# Patient Record
Sex: MALE | Race: WHITE
[De-identification: names, ages, dates, MRNs, and addresses within clinical notes are randomized per-mention and may not be internally consistent; named-entity substitution may affect disease eponyms.]

---

## 2019-07-22 ENCOUNTER — HOSPITAL ENCOUNTER (EMERGENCY)
Dept: HOSPITAL 26 - MED | Age: 64
Discharge: HOME | End: 2019-07-22
Payer: COMMERCIAL

## 2019-07-22 VITALS — DIASTOLIC BLOOD PRESSURE: 90 MMHG | SYSTOLIC BLOOD PRESSURE: 140 MMHG

## 2019-07-22 VITALS — BODY MASS INDEX: 34.82 KG/M2 | WEIGHT: 280 LBS | HEIGHT: 75 IN

## 2019-07-22 DIAGNOSIS — I50.9: ICD-10-CM

## 2019-07-22 DIAGNOSIS — Z76.0: ICD-10-CM

## 2019-07-22 DIAGNOSIS — E11.9: Primary | ICD-10-CM

## 2019-07-22 DIAGNOSIS — Z86.73: ICD-10-CM

## 2019-07-22 NOTE — NUR
Patient discharged with v/s stable. Written and verbal after care instructions 
given and explained. 

Patient alert, oriented and verbalized understanding of instructions. Left 
using wheelchair device. All questions addressed prior to discharge. ID band 
removed. Patient advised to follow up with PMD. Rx of Metformin given. Patient 
educated on indication of medication including possible reaction and side 
effects. Opportunity to ask questions provided and answered.

## 2019-07-22 NOTE — NUR
64/M PRESENTS TO ED FROM [facility] C/O MEDICAL EXAM. PT STATES THAT HIS 
HEALTH IS NOT MONITORED AS IT SHOULD BE AT THE [facility], HE WOULD LIKE TO 
BE SEEN TO MAKE SURE HIS HEALTH IS OKAY. DUE TO A SEIZURE A MONTH AGO AND DUE 
TO MEDICAL HX OF HTN, CHF, DM AND SEIZURES.

## 2019-09-04 ENCOUNTER — HOSPITAL ENCOUNTER (EMERGENCY)
Dept: HOSPITAL 26 - MED | Age: 64
Discharge: HOME | End: 2019-09-04
Payer: COMMERCIAL

## 2019-09-04 VITALS — HEIGHT: 75 IN | WEIGHT: 200 LBS | BODY MASS INDEX: 24.87 KG/M2

## 2019-09-04 VITALS — SYSTOLIC BLOOD PRESSURE: 122 MMHG | DIASTOLIC BLOOD PRESSURE: 95 MMHG

## 2019-09-04 VITALS — DIASTOLIC BLOOD PRESSURE: 81 MMHG | SYSTOLIC BLOOD PRESSURE: 156 MMHG

## 2019-09-04 DIAGNOSIS — I50.9: ICD-10-CM

## 2019-09-04 DIAGNOSIS — R53.1: Primary | ICD-10-CM

## 2019-09-04 DIAGNOSIS — E11.9: ICD-10-CM

## 2019-09-04 DIAGNOSIS — Z98.890: ICD-10-CM

## 2019-09-04 DIAGNOSIS — Z86.73: ICD-10-CM

## 2019-09-04 DIAGNOSIS — N39.0: ICD-10-CM

## 2019-09-04 DIAGNOSIS — I11.0: ICD-10-CM

## 2019-09-04 LAB
ALBUMIN FLD-MCNC: 3.1 G/DL (ref 3.4–5)
ANION GAP SERPL CALCULATED.3IONS-SCNC: 11.4 MMOL/L (ref 8–16)
APPEARANCE UR: (no result)
AST SERPL-CCNC: 14 U/L (ref 15–37)
BASOPHILS # BLD AUTO: 0 K/UL (ref 0–0.22)
BASOPHILS NFR BLD AUTO: 0.3 % (ref 0–2)
BILIRUB SERPL-MCNC: 0.3 MG/DL (ref 0–1)
BILIRUB UR QL STRIP: NEGATIVE
BUN SERPL-MCNC: 16 MG/DL (ref 7–18)
CHLORIDE SERPL-SCNC: 103 MMOL/L (ref 98–107)
CO2 SERPL-SCNC: 28.9 MMOL/L (ref 21–32)
COLOR UR: YELLOW
CREAT SERPL-MCNC: 1.8 MG/DL (ref 0.7–1.3)
EOSINOPHIL # BLD AUTO: 0 K/UL (ref 0–0.4)
EOSINOPHIL NFR BLD AUTO: 0.1 % (ref 0–4)
ERYTHROCYTE [DISTWIDTH] IN BLOOD BY AUTOMATED COUNT: 14.9 % (ref 11.6–13.7)
GFR SERPL CREATININE-BSD FRML MDRD: 49 ML/MIN (ref 90–?)
GLUCOSE SERPL-MCNC: 189 MG/DL (ref 74–106)
GLUCOSE UR STRIP-MCNC: NEGATIVE MG/DL
HCT VFR BLD AUTO: 32.9 % (ref 36–52)
HGB BLD-MCNC: 10.9 G/DL (ref 12–18)
HGB UR QL STRIP: (no result)
LEUKOCYTE ESTERASE UR QL STRIP: (no result)
LYMPHOCYTES # BLD AUTO: 1.1 K/UL (ref 2–11.5)
LYMPHOCYTES NFR BLD AUTO: 11.9 % (ref 20.5–51.1)
MCH RBC QN AUTO: 27 PG (ref 27–31)
MCHC RBC AUTO-ENTMCNC: 33 G/DL (ref 33–37)
MCV RBC AUTO: 81.8 FL (ref 80–94)
MONOCYTES # BLD AUTO: 0.7 K/UL (ref 0.8–1)
MONOCYTES NFR BLD AUTO: 7.6 % (ref 1.7–9.3)
NEUTROPHILS # BLD AUTO: 7.2 K/UL (ref 1.8–7.7)
NEUTROPHILS NFR BLD AUTO: 80.1 % (ref 42.2–75.2)
NITRITE UR QL STRIP: POSITIVE
PH UR STRIP: 6.5 [PH] (ref 5–9)
PLATELET # BLD AUTO: 249 K/UL (ref 140–450)
POTASSIUM SERPL-SCNC: 4.3 MMOL/L (ref 3.5–5.1)
PROTHROMBIN TIME: 9.5 SECS (ref 10.8–13.4)
RBC # BLD AUTO: 4.03 MIL/UL (ref 4.2–6.1)
RBC #/AREA URNS HPF: (no result) /HPF (ref 0–5)
SODIUM SERPL-SCNC: 139 MMOL/L (ref 136–145)
WBC # BLD AUTO: 8.9 K/UL (ref 4.8–10.8)
WBC,URINE: (no result) /HPF (ref 0–5)

## 2019-09-04 PROCEDURE — 96365 THER/PROPH/DIAG IV INF INIT: CPT

## 2019-09-04 PROCEDURE — 83880 ASSAY OF NATRIURETIC PEPTIDE: CPT

## 2019-09-04 PROCEDURE — 99284 EMERGENCY DEPT VISIT MOD MDM: CPT

## 2019-09-04 PROCEDURE — 84484 ASSAY OF TROPONIN QUANT: CPT

## 2019-09-04 PROCEDURE — 81001 URINALYSIS AUTO W/SCOPE: CPT

## 2019-09-04 PROCEDURE — 85025 COMPLETE CBC W/AUTO DIFF WBC: CPT

## 2019-09-04 PROCEDURE — 82948 REAGENT STRIP/BLOOD GLUCOSE: CPT

## 2019-09-04 PROCEDURE — 36415 COLL VENOUS BLD VENIPUNCTURE: CPT

## 2019-09-04 PROCEDURE — 93005 ELECTROCARDIOGRAM TRACING: CPT

## 2019-09-04 PROCEDURE — 85730 THROMBOPLASTIN TIME PARTIAL: CPT

## 2019-09-04 PROCEDURE — 87040 BLOOD CULTURE FOR BACTERIA: CPT

## 2019-09-04 PROCEDURE — 80053 COMPREHEN METABOLIC PANEL: CPT

## 2019-09-04 PROCEDURE — 83605 ASSAY OF LACTIC ACID: CPT

## 2019-09-04 PROCEDURE — 85610 PROTHROMBIN TIME: CPT

## 2019-09-04 PROCEDURE — 70450 CT HEAD/BRAIN W/O DYE: CPT

## 2019-09-04 PROCEDURE — 87086 URINE CULTURE/COLONY COUNT: CPT

## 2019-09-04 PROCEDURE — 71045 X-RAY EXAM CHEST 1 VIEW: CPT

## 2019-09-04 NOTE — NUR
PT ASLEEP. EASILY AROUSABLE BY VOICE. FULL CLEAR SPEECH. EVEN AND UNLABORED 
BREATHING. NO SIGNS AND SYMPTOMS OF DISTRESS NOTED. PT ON FULL CARDIAC MONITOR. 
WILL CONTINUE TO MONITOR.

## 2019-09-04 NOTE — NUR
-------------------------------------------------------------------------------

            *** Note undone in EDM - 09/04/19 at 0554 by MEDNORA ***             

-------------------------------------------------------------------------------

65 Y/O MALE BIBA C/O SORENESS/WEAKNESS/ STIFFNESS S/P SLIP AND FALL IN THE 
RESTROOM 5 AM YESTERDAY. PAIN IS A 6/10,  GENERALIZED. PATIENT IS PARALYZED ON 
THE RIGHT SIDE. A/O X4 TO PERSON PLACE, TIME, DATE. HE IS CONTRACTED TO THE 
RIGHT SIDE. 



PMH: HTN, CHF, DM, SEIZURES

RX:SEE MED REC. 

ALLERGIES: NONE Please see first message regarding anemia. Please asked patient if she be interested in getting IV iron to expedite her anemia improving. If so please right referral to hematology: Diagnoses iron deficiency anemia and comments section add menorrhagia. Thanks.

## 2019-09-04 NOTE — NUR
63 Y/O MALE BIBA C/O SORENESS/WEAKNESS/ STIFFNESS S/P SLIP AND FALL IN THE 
RESTROOM 5 AM YESTERDAY. PAIN IS A 6/10,  GENERALIZED. PATIENT IS PARALYZED ON 
THE RIGHT SIDE. A/O X4 TO PERSON PLACE, TIME, DATE. HE IS CONTRACTED TO THE 
RIGHT SIDE. ERMD AWARE OF PATIENT. SIDE RAILSX2.



PMH: HTN, CHF, DM, SEIZURES

RX:SEE MED REC. 

ALLERGIES: NONE

## 2019-09-04 NOTE — NUR
Patient discharged with v/s stable. Written and verbal after care instructions 
given and explained. Patient alert, oriented and verbalized understanding of 
instructions. Wheel Chair Assisted with by tommie Corbett. All questions 
addressed prior to discharge. ID band removed. Patient advised to follow up 
with PMD. Rx of Bactrim given. Patient educated on indication of medication 
including possible reaction and side effects. Opportunity to ask questions 
provided and answered. Pt transported back to [facility] by Tommie Corbett.

## 2020-01-09 ENCOUNTER — HOSPITAL ENCOUNTER (EMERGENCY)
Dept: HOSPITAL 26 - MED | Age: 65
Discharge: NURSING, REHAB OR LONG-TERM CARE | End: 2020-01-09
Payer: COMMERCIAL

## 2020-01-09 VITALS — WEIGHT: 280 LBS | BODY MASS INDEX: 34.82 KG/M2 | HEIGHT: 75 IN

## 2020-01-09 VITALS — DIASTOLIC BLOOD PRESSURE: 91 MMHG | SYSTOLIC BLOOD PRESSURE: 176 MMHG

## 2020-01-09 VITALS — SYSTOLIC BLOOD PRESSURE: 143 MMHG | DIASTOLIC BLOOD PRESSURE: 89 MMHG

## 2020-01-09 DIAGNOSIS — I11.0: ICD-10-CM

## 2020-01-09 DIAGNOSIS — I50.9: ICD-10-CM
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
## 2020-01-09 DIAGNOSIS — Z86.69: ICD-10-CM

## 2020-01-09 DIAGNOSIS — Z86.73: ICD-10-CM

## 2020-01-09 DIAGNOSIS — S93.401A: Primary | ICD-10-CM
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
## 2020-01-09 DIAGNOSIS — E11.9: ICD-10-CM

## 2020-01-09 PROCEDURE — 96372 THER/PROPH/DIAG INJ SC/IM: CPT

## 2020-01-09 PROCEDURE — 99283 EMERGENCY DEPT VISIT LOW MDM: CPT

## 2020-01-09 PROCEDURE — 73610 X-RAY EXAM OF ANKLE: CPT

## 2020-01-09 RX ADMIN — KETOROLAC TROMETHAMINE ONE MG: 60 INJECTION, SOLUTION INTRAMUSCULAR at 17:25

## 2020-01-09 NOTE — NUR
RIGHT ANKLE WRAPPED WITH AN ACE WRAP #3, PULSES, MOTOR, AND CIRCULATION INTACT 
BEFORE AND AFTER APPLICATION.

## 2020-01-09 NOTE — NUR
PT BIBA C/O RIGHT ANKEL PAIN S/P MECHANICAL FALL TODAY AT A DR'S APPOINTMENT. 
PT'S RIGHT ANKLE IS EDEMAOUS W/O DISCOLORATION OR DEFORMITY. REDUCED ROM. PT 
DENIES HIT HIS HEAD OR LOC DUE TO THE FALL. PATIENT STATES PAIN OF 8/10 AT THIS 
TIME; VSS; PATIENT POSITIONED FOR COMFORT; HOB ELEVATED; BEDRAILS UP X2; BED 
DOWN. ER MD MADE AWARE OF PT STATUS. PT IS ON THE MONITOR.

## 2020-01-09 NOTE — NUR
Patient to be transferred to [facility]. Patient belongings inventoried 
and will be sent with patient.  Prescription and care instruction will be sent 
with patient. Report called to the receiving facility.  [facility] ambulance service 
is transfering pt at bedside.

## 2020-01-10 ENCOUNTER — HOSPITAL ENCOUNTER (EMERGENCY)
Dept: HOSPITAL 26 - MED | Age: 65
Discharge: HOME | End: 2020-01-10
Payer: COMMERCIAL

## 2020-01-10 VITALS — DIASTOLIC BLOOD PRESSURE: 82 MMHG | SYSTOLIC BLOOD PRESSURE: 134 MMHG

## 2020-01-10 VITALS — WEIGHT: 280 LBS | BODY MASS INDEX: 34.82 KG/M2 | HEIGHT: 75 IN

## 2020-01-10 VITALS — SYSTOLIC BLOOD PRESSURE: 134 MMHG | DIASTOLIC BLOOD PRESSURE: 82 MMHG

## 2020-01-10 DIAGNOSIS — I50.9: ICD-10-CM

## 2020-01-10 DIAGNOSIS — Z86.69: ICD-10-CM

## 2020-01-10 DIAGNOSIS — E11.9: ICD-10-CM

## 2020-01-10 DIAGNOSIS — I11.0: ICD-10-CM
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
## 2020-01-10 DIAGNOSIS — S93.401A: Primary | ICD-10-CM
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
## 2020-01-10 DIAGNOSIS — Z86.73: ICD-10-CM

## 2020-01-10 RX ADMIN — Medication ONE MG: at 15:36

## 2020-01-10 NOTE — NUR
RECHECK FOR RT ANKLE SPRAIN SEEN YESTERDAY S/P FELL AT HIS DOCTORS APOINTMENT. 
DENIES ALOC OR OTHER INJURIES. NO DEFORMITY NOTED. CMS INTACT.



HX: LT CVA, RT HEMIPHARESIS, HTN, DM, CHF

## 2020-01-10 NOTE — NUR
Patient discharged with v/s stable. Written and verbal after care instructions 
given and explained. 

Patient verbalized understanding. LEFT VIA ELECTRIC W/C. All questions 
addressed prior to discharge. Advised to follow up with PMD.

## 2020-01-10 NOTE — NUR
POSTERIOR SHORT LEG SPLINT APPLIED TO THE PATIENT'S RIGHT ANKLE AND FOOT. ORTHO 
GLASS #5 USED IN COMBINATION WITH TWO ACE WRAP #3. PULSES, MOTOR, AND 
CIRCULATION INTACT BEFORE AND AFTER APPLICATION.

## 2020-01-10 NOTE — NUR
POSTERIOR SHORT LEG SPLINT APPLIED TO L LEG BY EMT. [facility] INTACT PRIOR TO 
SPLINT.  [facility] INTACT POST SPLINT.

## 2020-01-12 ENCOUNTER — HOSPITAL ENCOUNTER (EMERGENCY)
Dept: HOSPITAL 26 - MED | Age: 65
Discharge: HOME | End: 2020-01-12
Payer: COMMERCIAL

## 2020-01-12 VITALS — WEIGHT: 280 LBS | BODY MASS INDEX: 34.82 KG/M2 | HEIGHT: 75 IN

## 2020-01-12 VITALS — SYSTOLIC BLOOD PRESSURE: 140 MMHG | DIASTOLIC BLOOD PRESSURE: 96 MMHG

## 2020-01-12 VITALS — DIASTOLIC BLOOD PRESSURE: 77 MMHG | SYSTOLIC BLOOD PRESSURE: 123 MMHG

## 2020-01-12 DIAGNOSIS — J10.1: Primary | ICD-10-CM

## 2020-01-12 DIAGNOSIS — I11.0: ICD-10-CM

## 2020-01-12 DIAGNOSIS — I50.9: ICD-10-CM

## 2020-01-12 DIAGNOSIS — E11.9: ICD-10-CM

## 2020-01-12 DIAGNOSIS — Z86.73: ICD-10-CM

## 2020-01-12 DIAGNOSIS — Z90.49: ICD-10-CM

## 2020-01-12 LAB
ALBUMIN FLD-MCNC: 2.7 G/DL (ref 3.4–5)
ANION GAP SERPL CALCULATED.3IONS-SCNC: 10.1 MMOL/L (ref 8–16)
AST SERPL-CCNC: 19 U/L (ref 15–37)
BASOPHILS # BLD AUTO: 0 K/UL (ref 0–0.22)
BASOPHILS NFR BLD AUTO: 0.1 % (ref 0–2)
BILIRUB SERPL-MCNC: 0.3 MG/DL (ref 0–1)
BUN SERPL-MCNC: 15 MG/DL (ref 7–18)
CHLORIDE SERPL-SCNC: 107 MMOL/L (ref 98–107)
CO2 SERPL-SCNC: 28 MMOL/L (ref 21–32)
CREAT SERPL-MCNC: 1.8 MG/DL (ref 0.7–1.3)
EOSINOPHIL # BLD AUTO: 0 K/UL (ref 0–0.4)
EOSINOPHIL NFR BLD AUTO: 0 % (ref 0–4)
ERYTHROCYTE [DISTWIDTH] IN BLOOD BY AUTOMATED COUNT: 14.7 % (ref 11.6–13.7)
GFR SERPL CREATININE-BSD FRML MDRD: 49 ML/MIN (ref 90–?)
GLUCOSE SERPL-MCNC: 123 MG/DL (ref 74–106)
HCT VFR BLD AUTO: 31.7 % (ref 36–52)
HGB BLD-MCNC: 10.4 G/DL (ref 12–18)
LYMPHOCYTES # BLD AUTO: 0.2 K/UL (ref 2–11.5)
LYMPHOCYTES NFR BLD AUTO: 2.4 % (ref 20.5–51.1)
MCH RBC QN AUTO: 27 PG (ref 27–31)
MCHC RBC AUTO-ENTMCNC: 33 G/DL (ref 33–37)
MCV RBC AUTO: 81.2 FL (ref 80–94)
MONOCYTES # BLD AUTO: 0.5 K/UL (ref 0.8–1)
MONOCYTES NFR BLD AUTO: 5.3 % (ref 1.7–9.3)
NEUTROPHILS # BLD AUTO: 9.4 K/UL (ref 1.8–7.7)
NEUTROPHILS NFR BLD AUTO: 92.2 % (ref 42.2–75.2)
PLATELET # BLD AUTO: 209 K/UL (ref 140–450)
POTASSIUM SERPL-SCNC: 4.1 MMOL/L (ref 3.5–5.1)
RBC # BLD AUTO: 3.9 MIL/UL (ref 4.2–6.1)
SODIUM SERPL-SCNC: 141 MMOL/L (ref 136–145)
WBC # BLD AUTO: 10.2 K/UL (ref 4.8–10.8)

## 2020-01-12 PROCEDURE — 85025 COMPLETE CBC W/AUTO DIFF WBC: CPT

## 2020-01-12 PROCEDURE — 96374 THER/PROPH/DIAG INJ IV PUSH: CPT

## 2020-01-12 PROCEDURE — 99284 EMERGENCY DEPT VISIT MOD MDM: CPT

## 2020-01-12 PROCEDURE — 87804 INFLUENZA ASSAY W/OPTIC: CPT

## 2020-01-12 PROCEDURE — 96375 TX/PRO/DX INJ NEW DRUG ADDON: CPT

## 2020-01-12 PROCEDURE — 84484 ASSAY OF TROPONIN QUANT: CPT

## 2020-01-12 PROCEDURE — 71045 X-RAY EXAM CHEST 1 VIEW: CPT

## 2020-01-12 PROCEDURE — 80053 COMPREHEN METABOLIC PANEL: CPT

## 2020-01-12 PROCEDURE — 36415 COLL VENOUS BLD VENIPUNCTURE: CPT

## 2020-01-12 NOTE — NUR
Patient discharged with v/s stable. Written and verbal after care instructions 
given and explained. Patient alert, oriented and verbalized understanding of 
instructions. Wheel Chair Assisted with [facility] transport to nursing home. All 
questions addressed prior to discharge. ID band removed. Patient advised to 
follow up with PMD. Rx of Motrin 800mg and Norco 5mg was given. Patient 
educated on indication of medication including possible reaction and side 
effects. Opportunity to ask questions provided and answered.

## 2020-01-12 NOTE — NUR
63 YO M BIBA FROM [facility] FOR COLD S/SX. PT C/O RUNNY NOSE, SORE THROAT 
AND WET PRODUCTIVE COUGH X 3 DAYS. REPORTS SUBJECTIVE FEVER, CHILLS. DENIES 
NVD. LUNG SOUNDS DIMINISHED. PT BREATHING THROUGH MOUTH. DIFFICULTY TAKING DEEP 
BREATH. SKIN COLOR NORMAL FOR ETHNICITY, WARM, DRY. BREATHING EVEN, UNLABORED. 



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

-------------------------------------------------------------------------------

Addendum: 01/12/20 at 0707 by [facility]

-------------------------------------------------------------------------------

FROM [facility]

## 2020-01-12 NOTE — NUR
PT WILL BE TRANSPORTED BACK TO [facility] VIA [facility]. ETA 
[phone].

## 2020-01-14 ENCOUNTER — HOSPITAL ENCOUNTER (EMERGENCY)
Dept: HOSPITAL 26 - MED | Age: 65
Discharge: HOME | End: 2020-01-14
Payer: COMMERCIAL

## 2020-01-14 VITALS — SYSTOLIC BLOOD PRESSURE: 177 MMHG | DIASTOLIC BLOOD PRESSURE: 101 MMHG

## 2020-01-14 VITALS — BODY MASS INDEX: 34.82 KG/M2 | WEIGHT: 280 LBS | HEIGHT: 75 IN

## 2020-01-14 DIAGNOSIS — Z86.79: ICD-10-CM

## 2020-01-14 DIAGNOSIS — I10: ICD-10-CM

## 2020-01-14 DIAGNOSIS — Z90.49: ICD-10-CM

## 2020-01-14 DIAGNOSIS — J11.1: Primary | ICD-10-CM

## 2020-01-14 DIAGNOSIS — E11.9: ICD-10-CM

## 2020-01-14 PROCEDURE — 99283 EMERGENCY DEPT VISIT LOW MDM: CPT

## 2020-01-14 PROCEDURE — 71045 X-RAY EXAM CHEST 1 VIEW: CPT

## 2020-01-20 ENCOUNTER — HOSPITAL ENCOUNTER (INPATIENT)
Dept: HOSPITAL 26 - MED | Age: 65
LOS: 4 days | Discharge: HOME | DRG: 682 | End: 2020-01-24
Attending: GENERAL PRACTICE | Admitting: GENERAL PRACTICE
Payer: COMMERCIAL

## 2020-01-20 VITALS — BODY MASS INDEX: 31.81 KG/M2 | WEIGHT: 240 LBS | HEIGHT: 73 IN

## 2020-01-20 VITALS — SYSTOLIC BLOOD PRESSURE: 134 MMHG | DIASTOLIC BLOOD PRESSURE: 76 MMHG

## 2020-01-20 DIAGNOSIS — J18.9: ICD-10-CM

## 2020-01-20 DIAGNOSIS — E11.9: ICD-10-CM

## 2020-01-20 DIAGNOSIS — I50.9: ICD-10-CM

## 2020-01-20 DIAGNOSIS — I11.0: ICD-10-CM

## 2020-01-20 DIAGNOSIS — E44.0: ICD-10-CM

## 2020-01-20 DIAGNOSIS — E66.9: ICD-10-CM

## 2020-01-20 DIAGNOSIS — R33.8: ICD-10-CM

## 2020-01-20 DIAGNOSIS — G40.909: ICD-10-CM

## 2020-01-20 DIAGNOSIS — D63.8: ICD-10-CM

## 2020-01-20 DIAGNOSIS — E86.0: ICD-10-CM

## 2020-01-20 DIAGNOSIS — Z90.49: ICD-10-CM

## 2020-01-20 DIAGNOSIS — I69.351: ICD-10-CM

## 2020-01-20 DIAGNOSIS — N40.1: ICD-10-CM

## 2020-01-20 DIAGNOSIS — Z79.899: ICD-10-CM

## 2020-01-20 DIAGNOSIS — D50.9: ICD-10-CM

## 2020-01-20 DIAGNOSIS — N17.0: Primary | ICD-10-CM

## 2020-01-20 LAB
ALBUMIN FLD-MCNC: 3.2 G/DL (ref 3.4–5)
AMYLASE SERPL-CCNC: 106 U/L (ref 25–115)
ANION GAP SERPL CALCULATED.3IONS-SCNC: 13.3 MMOL/L (ref 8–16)
AST SERPL-CCNC: 18 U/L (ref 15–37)
BASOPHILS # BLD AUTO: 0 K/UL (ref 0–0.22)
BASOPHILS NFR BLD AUTO: 0.3 % (ref 0–2)
BILIRUB SERPL-MCNC: 0.3 MG/DL (ref 0–1)
BUN SERPL-MCNC: 16 MG/DL (ref 7–18)
CHLORIDE SERPL-SCNC: 105 MMOL/L (ref 98–107)
CHOLEST/HDLC SERPL: 5 {RATIO} (ref 1–4.5)
CK MB SERPL-MCNC: 1.2 NG/ML (ref 0–3.6)
CO2 SERPL-SCNC: 29.1 MMOL/L (ref 21–32)
CREAT SERPL-MCNC: 2.1 MG/DL (ref 0.7–1.3)
EOSINOPHIL # BLD AUTO: 0 K/UL (ref 0–0.4)
EOSINOPHIL NFR BLD AUTO: 0.3 % (ref 0–4)
ERYTHROCYTE [DISTWIDTH] IN BLOOD BY AUTOMATED COUNT: 15 % (ref 11.6–13.7)
GFR SERPL CREATININE-BSD FRML MDRD: 41 ML/MIN (ref 90–?)
GLUCOSE SERPL-MCNC: 83 MG/DL (ref 74–106)
HCT VFR BLD AUTO: 36.6 % (ref 36–52)
HDLC SERPL-MCNC: 25 MG/DL (ref 40–60)
HGB BLD-MCNC: 11.8 G/DL (ref 12–18)
LDLC SERPL CALC-MCNC: 75 MG/DL (ref 60–100)
LIPASE SERPL-CCNC: 151 U/L (ref 73–393)
LYMPHOCYTES # BLD AUTO: 1.2 K/UL (ref 2–11.5)
LYMPHOCYTES NFR BLD AUTO: 14.7 % (ref 20.5–51.1)
MAGNESIUM SERPL-MCNC: 2 MG/DL (ref 1.8–2.4)
MCH RBC QN AUTO: 26 PG (ref 27–31)
MCHC RBC AUTO-ENTMCNC: 32 G/DL (ref 33–37)
MCV RBC AUTO: 80.9 FL (ref 80–94)
MONOCYTES # BLD AUTO: 0.5 K/UL (ref 0.8–1)
MONOCYTES NFR BLD AUTO: 6.2 % (ref 1.7–9.3)
NEUTROPHILS # BLD AUTO: 6.4 K/UL (ref 1.8–7.7)
NEUTROPHILS NFR BLD AUTO: 78.5 % (ref 42.2–75.2)
PHOSPHATE SERPL-MCNC: 2.8 MG/DL (ref 2.5–4.9)
PLATELET # BLD AUTO: 249 K/UL (ref 140–450)
POTASSIUM SERPL-SCNC: 4.4 MMOL/L (ref 3.5–5.1)
PROTHROMBIN TIME: 9.9 SECS (ref 10.8–13.4)
RBC # BLD AUTO: 4.53 MIL/UL (ref 4.2–6.1)
SODIUM SERPL-SCNC: 143 MMOL/L (ref 136–145)
TRIGL SERPL-MCNC: 133 MG/DL (ref 30–150)
TSH SERPL DL<=0.05 MIU/L-ACNC: 2.7 UIU/ML (ref 0.34–3.74)
WBC # BLD AUTO: 8.2 K/UL (ref 4.8–10.8)

## 2020-01-20 NOTE — NUR
63 Y/O MALE PT BIBA BLS C/O PRODUCTIVE COUGH AND CONGESTION X 1 MONTH. A/OX4 
FOLLOWS COMMANDS; BREATHING UNLABORED AND SYMMETRICAL. CLEAR/DIMINISHED BREATH 
SOUNDS. DRY COUGH NOTED. RIGHT SIDED DEFICITS NOTED. ERMD MADE AWARE OF STATUS. 
SIDE RAILSX1. WILL CONTINUE TO MONITOR.



MEDHX: STROKE X 2, HTN, CHF, HLD

RX:SEE MED RECORD

NKDA

## 2020-01-21 VITALS — DIASTOLIC BLOOD PRESSURE: 93 MMHG | SYSTOLIC BLOOD PRESSURE: 134 MMHG

## 2020-01-21 VITALS — SYSTOLIC BLOOD PRESSURE: 133 MMHG | DIASTOLIC BLOOD PRESSURE: 79 MMHG

## 2020-01-21 VITALS — DIASTOLIC BLOOD PRESSURE: 95 MMHG | SYSTOLIC BLOOD PRESSURE: 141 MMHG

## 2020-01-21 LAB
ANION GAP SERPL CALCULATED.3IONS-SCNC: 13 MMOL/L (ref 8–16)
APPEARANCE UR: (no result)
BARBITURATES UR QL SCN: (no result) NG/ML
BASOPHILS # BLD AUTO: 0 K/UL (ref 0–0.22)
BASOPHILS NFR BLD AUTO: 0.2 % (ref 0–2)
BENZODIAZ UR QL SCN: (no result) NG/ML
BILIRUB UR QL STRIP: NEGATIVE
BUN SERPL-MCNC: 17 MG/DL (ref 7–18)
BZE UR QL SCN: (no result) NG/ML
CANNABINOIDS UR QL SCN: (no result) NG/ML
CHLORIDE SERPL-SCNC: 106 MMOL/L (ref 98–107)
CHOLEST/HDLC SERPL: 4.9 {RATIO} (ref 1–4.5)
CO2 SERPL-SCNC: 27 MMOL/L (ref 21–32)
COLOR UR: YELLOW
CREAT SERPL-MCNC: 2 MG/DL (ref 0.7–1.3)
EOSINOPHIL # BLD AUTO: 0 K/UL (ref 0–0.4)
EOSINOPHIL NFR BLD AUTO: 0.2 % (ref 0–4)
ERYTHROCYTE [DISTWIDTH] IN BLOOD BY AUTOMATED COUNT: 14.6 % (ref 11.6–13.7)
GFR SERPL CREATININE-BSD FRML MDRD: 43 ML/MIN (ref 90–?)
GLUCOSE SERPL-MCNC: 88 MG/DL (ref 74–106)
GLUCOSE UR STRIP-MCNC: NEGATIVE MG/DL
HCT VFR BLD AUTO: 31.1 % (ref 36–52)
HDLC SERPL-MCNC: 21 MG/DL (ref 40–60)
HGB BLD-MCNC: 10.3 G/DL (ref 12–18)
HGB UR QL STRIP: NEGATIVE
IRON SERPL-MCNC: 33 UG/DL (ref 50–175)
LDH SERPL-CCNC: 228 U/L (ref 85–227)
LDLC SERPL CALC-MCNC: 62 MG/DL (ref 60–100)
LEUKOCYTE ESTERASE UR QL STRIP: NEGATIVE
LYMPHOCYTES # BLD AUTO: 1.1 K/UL (ref 2–11.5)
LYMPHOCYTES NFR BLD AUTO: 17.3 % (ref 20.5–51.1)
MAGNESIUM SERPL-MCNC: 1.8 MG/DL (ref 1.8–2.4)
MCH RBC QN AUTO: 27 PG (ref 27–31)
MCHC RBC AUTO-ENTMCNC: 33 G/DL (ref 33–37)
MCV RBC AUTO: 80.2 FL (ref 80–94)
MONOCYTES # BLD AUTO: 0.4 K/UL (ref 0.8–1)
MONOCYTES NFR BLD AUTO: 6.9 % (ref 1.7–9.3)
NEUTROPHILS # BLD AUTO: 4.8 K/UL (ref 1.8–7.7)
NEUTROPHILS NFR BLD AUTO: 75.4 % (ref 42.2–75.2)
NITRITE UR QL STRIP: NEGATIVE
OPIATES UR QL SCN: (no result) NG/ML
PCP UR QL SCN: (no result) NG/ML
PH UR STRIP: 6 [PH] (ref 5–9)
PHOSPHATE SERPL-MCNC: 3.2 MG/DL (ref 2.5–4.9)
PLATELET # BLD AUTO: 214 K/UL (ref 140–450)
POTASSIUM SERPL-SCNC: 4 MMOL/L (ref 3.5–5.1)
RBC # BLD AUTO: 3.88 MIL/UL (ref 4.2–6.1)
SODIUM SERPL-SCNC: 142 MMOL/L (ref 136–145)
TIBC SERPL-MCNC: 235 UG/DL (ref 250–450)
TRIGL SERPL-MCNC: 99 MG/DL (ref 30–150)
WBC # BLD AUTO: 6.3 K/UL (ref 4.8–10.8)

## 2020-01-21 RX ADMIN — Medication SCH DEV: at 20:54

## 2020-01-21 RX ADMIN — FERROUS SULFATE TAB 325 MG (65 MG ELEMENTAL FE) SCH MG: 325 (65 FE) TAB at 10:49

## 2020-01-21 RX ADMIN — IPRATROPIUM BROMIDE AND ALBUTEROL SULFATE SCH ML: .5; 3 SOLUTION RESPIRATORY (INHALATION) at 13:01

## 2020-01-21 RX ADMIN — LOSARTAN POTASSIUM SCH MG: 25 TABLET, FILM COATED ORAL at 10:50

## 2020-01-21 RX ADMIN — GUAIFENESIN SCH MG: 600 TABLET, EXTENDED RELEASE ORAL at 10:51

## 2020-01-21 RX ADMIN — SODIUM CHLORIDE SCH MLS/HR: 9 INJECTION, SOLUTION INTRAVENOUS at 22:49

## 2020-01-21 RX ADMIN — Medication SCH EACH: at 10:49

## 2020-01-21 RX ADMIN — GLIPIZIDE SCH MG: 5 TABLET, FILM COATED, EXTENDED RELEASE ORAL at 10:49

## 2020-01-21 RX ADMIN — SODIUM CHLORIDE SCH MLS/HR: 9 INJECTION, SOLUTION INTRAVENOUS at 04:27

## 2020-01-21 RX ADMIN — Medication SCH DEV: at 06:34

## 2020-01-21 RX ADMIN — IPRATROPIUM BROMIDE AND ALBUTEROL SULFATE SCH ML: .5; 3 SOLUTION RESPIRATORY (INHALATION) at 19:39

## 2020-01-21 RX ADMIN — ATORVASTATIN CALCIUM SCH MG: 80 TABLET, FILM COATED ORAL at 10:51

## 2020-01-21 RX ADMIN — ASPIRIN TAB DELAYED RELEASE 81 MG SCH MG: 81 TABLET DELAYED RESPONSE at 10:51

## 2020-01-21 RX ADMIN — GUAIFENESIN SCH MG: 600 TABLET, EXTENDED RELEASE ORAL at 20:42

## 2020-01-21 RX ADMIN — TAMSULOSIN HYDROCHLORIDE SCH MG: 0.4 CAPSULE ORAL at 10:48

## 2020-01-21 RX ADMIN — Medication SCH DEV: at 16:30

## 2020-01-21 RX ADMIN — Medication SCH DEV: at 12:05

## 2020-01-21 RX ADMIN — FERROUS SULFATE TAB 325 MG (65 MG ELEMENTAL FE) SCH MG: 325 (65 FE) TAB at 20:43

## 2020-01-21 RX ADMIN — SODIUM CHLORIDE SCH MLS/HR: 9 INJECTION, SOLUTION INTRAVENOUS at 15:02

## 2020-01-21 NOTE — NUR
CHECKED PATIENT. PATIENT SLEEPING RESPIRATION EVEN UNLABORED ON 2L NC O2. NO DISTRESS NOTED. 
WILL CONTINUE TO MONITOR.

## 2020-01-21 NOTE — NUR
Patient will be admitted to care of DR. GOMEZ. Admited to MED-SURG ROOM 116   
Belongings list completed.  Report to ROSALINA AGRAWAL.

## 2020-01-21 NOTE — NUR
UNABLE TO START AN IV. CHARGE NURSE TRIED SEVERAL TIMES STILL NO LUCK. NOTIFIED MD. AWAITING 
FOR ORDERS.

## 2020-01-21 NOTE — NUR
BLOOD GLUCOSE 62, OLIVIA WAS GIVEN ORANGE JUICE 1 BOTTLE AND ENCOURAGED TO DRINK IT, 
PATIENT % OF DINNER, PATIENT IS AAOX4 AND IS VERBALLY RESPONDING TO COMMANDS. 
MEDICATIONS GIVEN AS ORDERED FOR BLOOD PRESSURE MANAGEMENT. OLIVIA DENIES PAIN AT THIS 
TIME, CALL LIGHT ON AND WITHIN REACH, REPOSITIONED, REVIEWED PLAN OF CARE WITH PATIENT, HE 
VERBALIZES UNDERSTANDING. WILL CONTINUE TO MONITOR.

## 2020-01-21 NOTE — NUR
PATIENT COMPLAINED OF 8/10 BACK MUSCLE SPASM PAIN. PRN MEDICATION GIVEN PER ORDER. WILL 
CONTINUE TO MONITOR.

## 2020-01-21 NOTE — NUR
PATIENT HAS BEEN SCREENED AND CATEGORIZED AS MODERATE NUTRITION RISK. PATIENT WILL BE SEEN 
WITHIN 3-5 DAYS OF ADMISSION.



01/23/20 01/25/20



[address]

## 2020-01-21 NOTE — NUR
RECEIVED BEDSIDE REPORT FROM [facility] NURSE. PATIENT IS AWAKE, ALERT, AND COOPERATIVE. 
ADMITTING DIAGNOSIS PNEUMONIA. RESPIRATION EVEN UNLABORED ON 2L NC O2. NO DISTRESS NOTED. 
SKIN IS WARM AND DRY. IV INFILTRATED. SWELLING NOTED. HEART RATE REGULAR. S1&S2 NOTED. LUNGS 
SOUNDS DIMINISHED. BOWEL SOUNDS ACTIVE AND PRESENT IN ALL QUADRANTS. ABDOMEN SOFT AND ROUND 
NON-TENDER TO TOUCH. LAST BM 1/20/20. RIGHT SIDEDNESS WEAKNESS NOTED HX OF CVA 1996. MRSA 
SCREEN DONE. VITALS WERE TAKEN. ORIENT PATIENT TO THE ROOM, STAFF, AND CALL LIGHT. ALL 
SAFETY MEASURES IN PLACE. BED IS AT LOW POSITION. CALL LIGHT WITHIN REACH AND VERBALIZES ITS 
USE. WILL CONTINUE TO MONITOR.

## 2020-01-21 NOTE — NUR
RECEIVED BEDSIDE REPORT FROM DAY SHIFT NURSE. PATIENT IS AWAKE, ALERT, AND COOPERATIVE. 
RESPIRATION EVEN UNLABORED ON 2L NC O2. NO DISTRESS NOTED. SKIN IS WARM AND DRY. IV PATENT 
AND INTACT. SPAIN CATHETER NOTED DRAINING YELLOW URINE. PLAN OF CARE WAS DISCUSSED. ALL 
SAFETY MEASURES IN PLACE. BED IS AT LOW POSITION. CALL LIGHT WITHIN REACH AND VERBALIZES ITS 
USE. WILL CONTINUE TO MONITOR.

## 2020-01-21 NOTE — NUR
REPORT RECEIVED FROM NIGHT SHIFT NURSE FOR CONTINUATION OF CARE. PATIENT IS RESTING IN BED, 
AAOX4, RESPONDS TO VERBAL COMMANDS. IV IS IN LEFT HAND, PICC LINE ORDERED AND TO BE GIVEN 
TODAY FOR HX OF CONSECUTIVE IV PLACEMENT FAILURE AND HIGH DEMAND FOR IV ANTIBIOTICS. CONSENT 
TO BE SIGNED AND PROCEDURE TO BE EXPLAINED BY RESIDENT PHYSICIAN. CALL LIGHT ON AND WITHIN 
REACH. WILL CONTINUE TO MONITOR.

## 2020-01-21 NOTE — NUR
EDUCATED PATIENT ON THE IMPORTANCE OF REPORTING INCREASED THIRST, URINATION, OR APPETITE AS 
EARLY SIGNS OF BLOOD GLUCOSE DYSFUNCTION AND THE IMPORTANCE OF EARLY INTERVENTION. OLIVIA 
VERBALIZES UNDERSTANDING. PATIENT IS RESTING IN BED, CALL LIGHT ON AND WITHIN REACH. DENIES 
PAIN AT THIS TIME. SPAIN CATHETER DRAINING CLEAR YELLOW URINE. HE DENIES DISCOMFORT AT THIS 
TIME. LUNCH TOLERATED WELL. WILL CONTINUE TO MONITOR.

## 2020-01-21 NOTE — NUR
PATIENT REFUSED TO HAVE A PICC LINE INSERTED, THERE IS AN IV IN HIS LEFT HAND THAT IS 
SUFFICIENT PER MD. PICC LINE ORDER DC'D. MEDICATIONS TOLERATED WELL, PATIENT HAS RIGHT SIDED 
HEMIPLEGIA. PATIENT IS RESTING IN BED, REPOSITIONED FOR COMFORT. EDUCATED ON THE IMPORTANCE 
OF USING THE CALL LIGHT WHEN IN NEED. WILL CONTINUE TO MONITOR.

## 2020-01-21 NOTE — NUR
PATIENT COMPLAINED OF BACK MUSCLE SPASM 9/10. ADMINISTERED MEDS PER ORDER. WILL CONTINUE TO 
MONITOR.

## 2020-01-21 NOTE — NUR
SPAIN CATHETER INSERTED PER MD'S ORDERS, PATIENT COMPLAINING OF URGE TO URINATE WITH NO 
SUCCESSFUL URINATION, HE REPORTS PAIN AND DISCOMFORT, UPON SUCCESSFUL INSERTION OF SPAIN 
CATHETER PATIENT HAD AN IMMEDIATE OUTPUT  ML'S OF DARK YELLOW URINE. PATIENT REPORTS 
RELIEF AT THIS TIME. EDUCATED ON THE SIGNS AND SYMPTOMS OF URINARY TRACT INFECTION AND TO 
REPORT TO NURSE IF FEELING PAIN OR FEVER. WILL CONTINUE TO MONITOR.

## 2020-01-22 VITALS — SYSTOLIC BLOOD PRESSURE: 123 MMHG | DIASTOLIC BLOOD PRESSURE: 73 MMHG

## 2020-01-22 VITALS — SYSTOLIC BLOOD PRESSURE: 133 MMHG | DIASTOLIC BLOOD PRESSURE: 75 MMHG

## 2020-01-22 VITALS — SYSTOLIC BLOOD PRESSURE: 133 MMHG | DIASTOLIC BLOOD PRESSURE: 74 MMHG

## 2020-01-22 VITALS — DIASTOLIC BLOOD PRESSURE: 81 MMHG | SYSTOLIC BLOOD PRESSURE: 144 MMHG

## 2020-01-22 LAB
ANION GAP SERPL CALCULATED.3IONS-SCNC: 12.7 MMOL/L (ref 8–16)
BASOPHILS # BLD AUTO: 0 K/UL (ref 0–0.22)
BASOPHILS NFR BLD AUTO: 0.2 % (ref 0–2)
BUN SERPL-MCNC: 16 MG/DL (ref 7–18)
CHLORIDE SERPL-SCNC: 109 MMOL/L (ref 98–107)
CO2 SERPL-SCNC: 24.8 MMOL/L (ref 21–32)
CREAT SERPL-MCNC: 1.8 MG/DL (ref 0.7–1.3)
EOSINOPHIL # BLD AUTO: 0 K/UL (ref 0–0.4)
EOSINOPHIL NFR BLD AUTO: 0.1 % (ref 0–4)
ERYTHROCYTE [DISTWIDTH] IN BLOOD BY AUTOMATED COUNT: 14.3 % (ref 11.6–13.7)
FERRITIN SERPL-MCNC: 318 NG/ML (ref 30–400)
FOLATE SERPL-MCNC: 7.9 NG/ML (ref 3–?)
GFR SERPL CREATININE-BSD FRML MDRD: 49 ML/MIN (ref 90–?)
GLUCOSE SERPL-MCNC: 160 MG/DL (ref 74–106)
HCT VFR BLD AUTO: 33.5 % (ref 36–52)
HGB BLD-MCNC: 11.1 G/DL (ref 12–18)
LYMPHOCYTES # BLD AUTO: 0.8 K/UL (ref 2–11.5)
LYMPHOCYTES NFR BLD AUTO: 12.2 % (ref 20.5–51.1)
MAGNESIUM SERPL-MCNC: 1.8 MG/DL (ref 1.8–2.4)
MCH RBC QN AUTO: 27 PG (ref 27–31)
MCHC RBC AUTO-ENTMCNC: 33 G/DL (ref 33–37)
MCV RBC AUTO: 80.1 FL (ref 80–94)
MONOCYTES # BLD AUTO: 0.3 K/UL (ref 0.8–1)
MONOCYTES NFR BLD AUTO: 4.7 % (ref 1.7–9.3)
NEUTROPHILS # BLD AUTO: 5.6 K/UL (ref 1.8–7.7)
NEUTROPHILS NFR BLD AUTO: 82.8 % (ref 42.2–75.2)
PHOSPHATE SERPL-MCNC: 2.4 MG/DL (ref 2.5–4.9)
PLATELET # BLD AUTO: 237 K/UL (ref 140–450)
POTASSIUM SERPL-SCNC: 4.5 MMOL/L (ref 3.5–5.1)
RBC # BLD AUTO: 4.18 MIL/UL (ref 4.2–6.1)
SODIUM SERPL-SCNC: 142 MMOL/L (ref 136–145)
WBC # BLD AUTO: 6.8 K/UL (ref 4.8–10.8)

## 2020-01-22 RX ADMIN — DEXTROSE MONOHYDRATE PRN ML: 25 INJECTION, SOLUTION INTRAVENOUS at 05:53

## 2020-01-22 RX ADMIN — GUAIFENESIN SCH MG: 600 TABLET, EXTENDED RELEASE ORAL at 09:11

## 2020-01-22 RX ADMIN — GUAIFENESIN SCH MG: 600 TABLET, EXTENDED RELEASE ORAL at 21:02

## 2020-01-22 RX ADMIN — IPRATROPIUM BROMIDE AND ALBUTEROL SULFATE SCH ML: .5; 3 SOLUTION RESPIRATORY (INHALATION) at 13:17

## 2020-01-22 RX ADMIN — IPRATROPIUM BROMIDE AND ALBUTEROL SULFATE SCH ML: .5; 3 SOLUTION RESPIRATORY (INHALATION) at 19:24

## 2020-01-22 RX ADMIN — Medication SCH DEV: at 21:11

## 2020-01-22 RX ADMIN — Medication SCH DEV: at 06:25

## 2020-01-22 RX ADMIN — FERROUS SULFATE TAB 325 MG (65 MG ELEMENTAL FE) SCH MG: 325 (65 FE) TAB at 21:02

## 2020-01-22 RX ADMIN — ASPIRIN TAB DELAYED RELEASE 81 MG SCH MG: 81 TABLET DELAYED RESPONSE at 09:08

## 2020-01-22 RX ADMIN — ATORVASTATIN CALCIUM SCH MG: 80 TABLET, FILM COATED ORAL at 09:08

## 2020-01-22 RX ADMIN — DEXTROSE MONOHYDRATE PRN ML: 25 INJECTION, SOLUTION INTRAVENOUS at 17:02

## 2020-01-22 RX ADMIN — AZITHROMYCIN DIHYDRATE SCH MG: 250 TABLET, FILM COATED ORAL at 09:10

## 2020-01-22 RX ADMIN — LOSARTAN POTASSIUM SCH MG: 25 TABLET, FILM COATED ORAL at 09:12

## 2020-01-22 RX ADMIN — TAMSULOSIN HYDROCHLORIDE SCH MG: 0.4 CAPSULE ORAL at 09:13

## 2020-01-22 RX ADMIN — HYDROCODONE BITARTRATE AND ACETAMINOPHEN PRN TAB: 5; 325 TABLET ORAL at 13:53

## 2020-01-22 RX ADMIN — IPRATROPIUM BROMIDE AND ALBUTEROL SULFATE SCH ML: .5; 3 SOLUTION RESPIRATORY (INHALATION) at 06:33

## 2020-01-22 RX ADMIN — Medication SCH DEV: at 17:01

## 2020-01-22 RX ADMIN — GLIPIZIDE SCH MG: 5 TABLET, FILM COATED, EXTENDED RELEASE ORAL at 09:10

## 2020-01-22 RX ADMIN — Medication SCH EACH: at 09:13

## 2020-01-22 RX ADMIN — FERROUS SULFATE TAB 325 MG (65 MG ELEMENTAL FE) SCH MG: 325 (65 FE) TAB at 09:11

## 2020-01-22 RX ADMIN — Medication SCH DEV: at 12:04

## 2020-01-22 RX ADMIN — DEXTROSE AND SODIUM CHLORIDE SCH MLS/HR: 5; .9 INJECTION, SOLUTION INTRAVENOUS at 12:17

## 2020-01-22 NOTE — NUR
ALL SCHEDULED MEDS WERE GIVEN PER ORDER. NO ASE NOTED. PATIENT BLOOD SUGAR 83. GAVE PATIENT 
ORANGE JUICE AND APPLE SAUCE. WILL CONTINUE TO MONITOR.

## 2020-01-22 NOTE — NUR
RE- ASSESS PT BLOOD GLUCOSE: 120. PT IS STABLE, NO SIGNS OF DISTRESS NOTED, CALL LIGHT 
WITHIN REACH.

## 2020-01-22 NOTE — NUR
SCREEN FOR LOW MAGDALENE SCALE AT RISK, CONTINUE TO FOLLOW PRESSURE ULCER PREVENTION 
INTERVENTIONS.

-TURN AND REPOSITION PATIENT Q2H, ASSIST IF NEEDED

-ASSESS AND MONITOR SKIN CONDITION DURING POSITION CHANGES

-OFFLOAD BILATERAL HEELS BY PLACING PILLOWS UNDER CALVES AT ALL TIMES, UNLESS OTHERWISE 
CONTRAINDICATED

-PRESSURE REDISTRIBUTION BY PLACING PILLOWS AND OFFLOADING SACRALCOCCYX

-KEEP SKIN CLEAN AND DRY AT ALL TIMES.

## 2020-01-22 NOTE — NUR
RECEIVED BEDSIDE REPORT FROM DAY SHIFT NURSE. PATIENT IS AWAKE, ALERT, AND COOPERATIVE. 
RESPIRATION EVEN UNLABORED ON ROOM AIR. NO DISTRESS NOTED. SKIN IS WARM AND DRY. IV PATENT 
AND INTACT. SPAIN CATHETER DRAINING YELLOW URINE. PLAN OF CARE WAS DISCUSSED. ALL SAFETY 
MEASURES IN PLACE. BED IS AT LOW POSITION. CALL LIGHT WITHIN REACH AND VERBALIZES ITS USE. 
WILL CONTINUE TO MONITOR.

## 2020-01-22 NOTE — NUR
CHECKED PATIENT BLOOD SUGAR. PATIENT BLOOD SUGAR 59. PATIENT IS AWAKE, ALERT, AND NO 
DISTRESS NOTED. GAVE ORANGE JUICE WITH SUGAR. RECHECK BLOOD SUGAR STILL LOW. WILL 
ADMINISTERED D50 PER ORDER

## 2020-01-22 NOTE — NUR
RECEIVED REPORT FROM NIGHT NURSE, PT IS STABLE, RESTING IN BED, INTRODUCE SELF, WILL 
CONTINUE TO MONITOR, CALL LIGHT WITHIN REACH, SAFETY MEASURES IN PLACE.

## 2020-01-22 NOTE — NUR
CHECKED PATIENT. PATIENT IN BED WATCHING TV RESPIRATION EVEN UNLABORED ON ROOM AIR. NO 
DISTRESS NOTED. WILL CONTINUE TO MONITOR.

## 2020-01-22 NOTE — NUR
PT NPO EXCEPT FOR MEDS AS HE IS DUE FOR A SWALLOW EVALUVATION, BLOOD GLUCOSE IS 68, INFORMED 
DR VILLARREAL ABOUT GLUCOSE, MD CHANGED IV FLUIDS TO D5NS AT 60ML/H, ADMINISTERED THE ORDERED, PT 
IS STABLE, CALL LIGHT WITHIN REACH.

## 2020-01-22 NOTE — NUR
GAVE PT ORDERED MEDICATION AND NORCO FOR BACK PAIN 7/10, PT EDUCATION GIVEN, PT TOLERATED 
WELL, PT IS STABLE, CALL LIGHT WITHIN REACH.

## 2020-01-22 NOTE — NUR
GAVE PT DEXTROSE 50 FOR BLOOD SUGAR OF 55 PER PROTOCOL, PT EDUCATION GIVEN, PT TOLERATED 
WELL, WILL RE-ASSESS BLOOD SUGAR IN 30 MINUTES

## 2020-01-23 VITALS — DIASTOLIC BLOOD PRESSURE: 72 MMHG | SYSTOLIC BLOOD PRESSURE: 136 MMHG

## 2020-01-23 VITALS — SYSTOLIC BLOOD PRESSURE: 124 MMHG | DIASTOLIC BLOOD PRESSURE: 73 MMHG

## 2020-01-23 VITALS — SYSTOLIC BLOOD PRESSURE: 138 MMHG | DIASTOLIC BLOOD PRESSURE: 74 MMHG

## 2020-01-23 VITALS — SYSTOLIC BLOOD PRESSURE: 124 MMHG | DIASTOLIC BLOOD PRESSURE: 64 MMHG

## 2020-01-23 VITALS — DIASTOLIC BLOOD PRESSURE: 72 MMHG | SYSTOLIC BLOOD PRESSURE: 138 MMHG

## 2020-01-23 LAB — TRANSFERRIN SERPL-MCNC: 198 MG/DL (ref 200–370)

## 2020-01-23 RX ADMIN — LOSARTAN POTASSIUM SCH MG: 25 TABLET, FILM COATED ORAL at 10:16

## 2020-01-23 RX ADMIN — GUAIFENESIN SCH MG: 600 TABLET, EXTENDED RELEASE ORAL at 22:20

## 2020-01-23 RX ADMIN — ATORVASTATIN CALCIUM SCH MG: 80 TABLET, FILM COATED ORAL at 10:14

## 2020-01-23 RX ADMIN — IPRATROPIUM BROMIDE AND ALBUTEROL SULFATE SCH ML: .5; 3 SOLUTION RESPIRATORY (INHALATION) at 08:40

## 2020-01-23 RX ADMIN — Medication SCH DEV: at 21:43

## 2020-01-23 RX ADMIN — ASPIRIN TAB DELAYED RELEASE 81 MG SCH MG: 81 TABLET DELAYED RESPONSE at 10:16

## 2020-01-23 RX ADMIN — FERROUS SULFATE TAB 325 MG (65 MG ELEMENTAL FE) SCH MG: 325 (65 FE) TAB at 10:14

## 2020-01-23 RX ADMIN — GLIPIZIDE SCH MG: 5 TABLET, FILM COATED, EXTENDED RELEASE ORAL at 10:13

## 2020-01-23 RX ADMIN — TAMSULOSIN HYDROCHLORIDE SCH MG: 0.4 CAPSULE ORAL at 10:13

## 2020-01-23 RX ADMIN — Medication SCH EACH: at 10:13

## 2020-01-23 RX ADMIN — DEXTROSE AND SODIUM CHLORIDE SCH MLS/HR: 5; .9 INJECTION, SOLUTION INTRAVENOUS at 02:40

## 2020-01-23 RX ADMIN — Medication SCH DEV: at 06:19

## 2020-01-23 RX ADMIN — Medication SCH DEV: at 11:30

## 2020-01-23 RX ADMIN — DEXTROSE AND SODIUM CHLORIDE SCH MLS/HR: 5; .9 INJECTION, SOLUTION INTRAVENOUS at 21:50

## 2020-01-23 RX ADMIN — DEXTROSE MONOHYDRATE PRN ML: 25 INJECTION, SOLUTION INTRAVENOUS at 05:42

## 2020-01-23 RX ADMIN — Medication SCH DEV: at 18:04

## 2020-01-23 RX ADMIN — IPRATROPIUM BROMIDE AND ALBUTEROL SULFATE SCH ML: .5; 3 SOLUTION RESPIRATORY (INHALATION) at 14:31

## 2020-01-23 RX ADMIN — FERROUS SULFATE TAB 325 MG (65 MG ELEMENTAL FE) SCH MG: 325 (65 FE) TAB at 21:56

## 2020-01-23 RX ADMIN — HYDROCODONE BITARTRATE AND ACETAMINOPHEN PRN TAB: 5; 325 TABLET ORAL at 18:19

## 2020-01-23 RX ADMIN — IPRATROPIUM BROMIDE AND ALBUTEROL SULFATE SCH ML: .5; 3 SOLUTION RESPIRATORY (INHALATION) at 20:05

## 2020-01-23 RX ADMIN — GUAIFENESIN SCH MG: 600 TABLET, EXTENDED RELEASE ORAL at 10:15

## 2020-01-23 RX ADMIN — AZITHROMYCIN DIHYDRATE SCH MG: 250 TABLET, FILM COATED ORAL at 10:15

## 2020-01-23 NOTE — NUR
DC PLANNIN YRS OLD MALE PATIENT WAS ADMITTED FROM [facility] WITH A DX OF PNEUMONIA PATIENT 
HAS A HX OF DM, HTN , STROKE WITH LEFT SIDE WEAKNESS BPH AND SEIZURE DISORDER. C-XRAY 
CARDIOMEGALY , BLOOD,URINE AND SPUTUM PENDING ADMINISTERED IVF ,IV ABX WITH AZITHROMYCIN AND 
ROCEPHIN ,  CONTINUE HOME MEDS SEIZURE PRECAUTIONS , SWALLOWING EVAL RECOMMENDED MECHANICAL 
SOFT DIET. DC PLAN TO GO BACK TO [facility]. CM TO FOLLOW

## 2020-01-23 NOTE — NUR
CHECKED PATIENT BLOOD SUGAR. PATIENT BLOOD SUGAR 69. PATIENT IS ALERT, AWAKE, NO DISTRESS 
NOTED. D50 ADMINISTERED PER ORDER. WILL RECHECK BLOOD SUGAR.

## 2020-01-23 NOTE — NUR
*S.T. BEDSIDE SWALLOW EVAL COMPLETED*

See report for details. Pt presents w/ mild oral difficulty managing solids as he is 
edentulous 

and reportedly wears dentures for P.O. intake. However, dentures are not 

present in hospital at this time. Pt able to self-feed w/ non-dominant L 

hand. No overt s/s aspiration observed across all textures trialed. Pt 

able to gum up solid food w/ mild difficulty at reportedly does this 

occasionally at baseline.

Recommend:

1) Advance to mechanical soft chopped diet, thin liquids okay. Straws okay.

2) P.O. meds okay, whole, one at a time.

3) Nsg to assist w/ tray set up and repositioning pt to promote self 

feeding w/ his non-dominant L UE.

Pt appears to be functioning at his reported baseline level. Therefore, no 

further swallow tx is indicated at this time. DC to nsg care. Endorsed to 

ROSALINA Lazar. 

Time [id number]

## 2020-01-23 NOTE — NUR
RECEIVED PT FROM CATHY RN PT IS AAOX4 ON BED REST  S/P CVA RT SIDE WEAKNESS  IV ON LEFT FA 
INFUSING WELL FOLEYCATH DRAINING WELL YELLOW URINE REPOSITIONED INITIAL ASSESSMENT DONE

## 2020-01-24 VITALS — SYSTOLIC BLOOD PRESSURE: 126 MMHG | DIASTOLIC BLOOD PRESSURE: 77 MMHG

## 2020-01-24 VITALS — SYSTOLIC BLOOD PRESSURE: 140 MMHG | DIASTOLIC BLOOD PRESSURE: 74 MMHG

## 2020-01-24 RX ADMIN — GUAIFENESIN SCH MG: 600 TABLET, EXTENDED RELEASE ORAL at 09:16

## 2020-01-24 RX ADMIN — GLIPIZIDE SCH MG: 5 TABLET, FILM COATED, EXTENDED RELEASE ORAL at 09:15

## 2020-01-24 RX ADMIN — DEXTROSE AND SODIUM CHLORIDE SCH MLS/HR: 5; .9 INJECTION, SOLUTION INTRAVENOUS at 07:42

## 2020-01-24 RX ADMIN — Medication SCH DEV: at 07:36

## 2020-01-24 RX ADMIN — ATORVASTATIN CALCIUM SCH MG: 80 TABLET, FILM COATED ORAL at 09:16

## 2020-01-24 RX ADMIN — TAMSULOSIN HYDROCHLORIDE SCH MG: 0.4 CAPSULE ORAL at 09:14

## 2020-01-24 RX ADMIN — AZITHROMYCIN DIHYDRATE SCH MG: 250 TABLET, FILM COATED ORAL at 09:16

## 2020-01-24 RX ADMIN — Medication SCH EACH: at 09:12

## 2020-01-24 RX ADMIN — Medication SCH DEV: at 11:30

## 2020-01-24 RX ADMIN — IPRATROPIUM BROMIDE AND ALBUTEROL SULFATE SCH ML: .5; 3 SOLUTION RESPIRATORY (INHALATION) at 07:50

## 2020-01-24 RX ADMIN — FERROUS SULFATE TAB 325 MG (65 MG ELEMENTAL FE) SCH MG: 325 (65 FE) TAB at 09:13

## 2020-01-24 RX ADMIN — LOSARTAN POTASSIUM SCH MG: 25 TABLET, FILM COATED ORAL at 09:12

## 2020-01-24 RX ADMIN — IPRATROPIUM BROMIDE AND ALBUTEROL SULFATE SCH ML: .5; 3 SOLUTION RESPIRATORY (INHALATION) at 14:18

## 2020-01-24 RX ADMIN — ASPIRIN TAB DELAYED RELEASE 81 MG SCH MG: 81 TABLET DELAYED RESPONSE at 09:12

## 2020-01-24 NOTE — NUR
RECEIVED REPORT FROM NIGHT SHIFT NURSE. PT IS AWAKE AND ORIENTED. PT IS STABLE. CALL LIGHT 
WITHIN PT'S REACH, BED ON LOW, SIDERAILS UP. PT HAS IV AT LEFT AC 20G. WILL CONTINUE TO 
MONITOR

## 2020-01-24 NOTE — NUR
SCHEDULED MEDS GIVEN. PT TOLERATED WELL. MEDICATION EDUCATION AND SIDE EFFECTS INSTRUCTED. 
PT VERBALIZED UNDERSTANDING. CALL LIGHT WITHIN PT'S REACH. WILL CONTINUE TO MONITOR.

## 2020-01-24 NOTE — NUR
PT IS PICKED UP BY [facility] FROM [facility]. PT IS STABLE. PT HAS ALL HIS BELONGINGS 
WITH HIM UPON D/C.

## 2020-01-24 NOTE — NUR
PT IS FOR D/C TO [facility]. SPOKE TO MAGI FOR ARRANGEMENT OF TRANSPORT. SHE SAID 
 TIME IS BETWEEN 1-2PM TODAY. WILL CONTINUE TO MONITOR

## 2020-03-22 ENCOUNTER — HOSPITAL ENCOUNTER (EMERGENCY)
Dept: HOSPITAL 26 - MED | Age: 65
Discharge: HOME | End: 2020-03-22
Payer: COMMERCIAL

## 2020-03-22 VITALS — SYSTOLIC BLOOD PRESSURE: 135 MMHG | DIASTOLIC BLOOD PRESSURE: 79 MMHG

## 2020-03-22 VITALS — DIASTOLIC BLOOD PRESSURE: 79 MMHG | SYSTOLIC BLOOD PRESSURE: 135 MMHG

## 2020-03-22 VITALS — WEIGHT: 287 LBS | BODY MASS INDEX: 35.68 KG/M2 | HEIGHT: 75 IN

## 2020-03-22 DIAGNOSIS — Z79.82: ICD-10-CM

## 2020-03-22 DIAGNOSIS — L30.8: Primary | ICD-10-CM

## 2020-03-22 DIAGNOSIS — E11.9: ICD-10-CM

## 2020-03-22 DIAGNOSIS — I50.9: ICD-10-CM

## 2020-03-22 DIAGNOSIS — Z79.899: ICD-10-CM

## 2020-03-22 DIAGNOSIS — I11.0: ICD-10-CM

## 2020-03-22 DIAGNOSIS — Z86.73: ICD-10-CM

## 2020-03-22 NOTE — NUR
PT BIBA C/O BILATERAL LEG AND FOOT PAIN 10/10. RESIDES AT [facility]. 
STATES HE HAS AN OINTMENT MEDICATION THAT HE HAS BEEN UNABLE TO APPLY TO HIS 
LEGS; WHICH HAS EXACERBATED HIS PAIN. HX STROKE WITH PARALYSIS ON RIGHT SIDE. 
AOX4. PEDAL PULSES +3 BILATERALLY. BASELINE MOBILITY. VSS. PT APPEARS CONTENT; 
SAT UPRIGHT IN [facility]. WILL CONTINUE TO MONITOR.

## 2020-03-22 NOTE — NUR
Patient discharged with v/s stable. Written and verbal after care instructions 
given and explained. 

Patient verbalized understanding. Ambulance Transport with to nursing home. All 
questions addressed prior to discharge. Advised to follow up with PMD.

## 2020-06-28 ENCOUNTER — HOSPITAL ENCOUNTER (EMERGENCY)
Dept: HOSPITAL 26 - MED | Age: 65
Discharge: LEFT BEFORE DISCHARGE | End: 2020-06-28
Payer: COMMERCIAL

## 2020-06-28 DIAGNOSIS — Z53.21: Primary | ICD-10-CM

## 2020-08-16 ENCOUNTER — HOSPITAL ENCOUNTER (EMERGENCY)
Dept: HOSPITAL 26 - MED | Age: 65
Discharge: HOME | End: 2020-08-16
Payer: COMMERCIAL

## 2020-08-16 VITALS — BODY MASS INDEX: 34.82 KG/M2 | HEIGHT: 75 IN | WEIGHT: 280 LBS

## 2020-08-16 VITALS — SYSTOLIC BLOOD PRESSURE: 127 MMHG | DIASTOLIC BLOOD PRESSURE: 78 MMHG

## 2020-08-16 DIAGNOSIS — Z79.899: ICD-10-CM

## 2020-08-16 DIAGNOSIS — Z79.82: ICD-10-CM

## 2020-08-16 DIAGNOSIS — Z86.73: ICD-10-CM

## 2020-08-16 DIAGNOSIS — B35.3: Primary | ICD-10-CM

## 2020-08-16 DIAGNOSIS — I11.0: ICD-10-CM

## 2020-08-16 DIAGNOSIS — E11.9: ICD-10-CM

## 2020-08-16 DIAGNOSIS — I50.9: ICD-10-CM

## 2020-08-16 NOTE — NUR
66 Y/O M C/C BLISTERS/ABSCESS ON BILATERAL BUTTOCKS AND THIGHS X 8 MONTHS. SKIN 
PRESENTS WITH SLIGHT BUMP, NO OPEN WOUNDS NOTED, NO BLEEDING NOTED. PT NKA. HX 
HTN,DM,SZ. RX KEPPRA,GLIPIZIDE,METOPROLOL. NO NVD. PT PRESENTS 
EUPNIC,VSS,WHEELCHAIR ASSISTED,A/OX4. SIDE RAIL X1.

## 2020-08-16 NOTE — NUR
Patient discharged with v/s stable. Written and verbal after care instructions 
given and explained. 

Patient alert, oriented and verbalized understanding of instructions. Wheel 
Chair Assisted with to home. All questions addressed prior to discharge. ID 
band removed. Patient advised to follow up with PMD. Rx of KETOCONAZOLE given. 
Patient educated on indication of medication including possible reaction and 
side effects. Opportunity to ask questions provided and answered.

## 2020-08-21 ENCOUNTER — HOSPITAL ENCOUNTER (EMERGENCY)
Dept: HOSPITAL 26 - MED | Age: 65
Discharge: NURSING, REHAB OR LONG-TERM CARE | End: 2020-08-21
Payer: COMMERCIAL

## 2020-08-21 VITALS — WEIGHT: 280 LBS | HEIGHT: 75 IN | BODY MASS INDEX: 34.82 KG/M2

## 2020-08-21 VITALS — SYSTOLIC BLOOD PRESSURE: 108 MMHG | DIASTOLIC BLOOD PRESSURE: 69 MMHG

## 2020-08-21 VITALS — SYSTOLIC BLOOD PRESSURE: 110 MMHG | DIASTOLIC BLOOD PRESSURE: 65 MMHG

## 2020-08-21 DIAGNOSIS — Z79.899: ICD-10-CM

## 2020-08-21 DIAGNOSIS — E11.22: Primary | ICD-10-CM

## 2020-08-21 DIAGNOSIS — Z86.73: ICD-10-CM

## 2020-08-21 DIAGNOSIS — I13.10: ICD-10-CM

## 2020-08-21 DIAGNOSIS — Z79.82: ICD-10-CM

## 2020-08-21 DIAGNOSIS — N18.9: ICD-10-CM

## 2020-08-21 LAB
ALBUMIN FLD-MCNC: 3.1 G/DL (ref 3.4–5)
ANION GAP SERPL CALCULATED.3IONS-SCNC: 13.1 MMOL/L (ref 8–16)
APPEARANCE UR: CLEAR
AST SERPL-CCNC: 14 U/L (ref 15–37)
BASOPHILS # BLD AUTO: 0.1 K/UL (ref 0–0.22)
BASOPHILS NFR BLD AUTO: 0.6 % (ref 0–2)
BILIRUB SERPL-MCNC: 0.4 MG/DL (ref 0–1)
BILIRUB UR QL STRIP: NEGATIVE
BUN SERPL-MCNC: 36 MG/DL (ref 7–18)
CHLORIDE SERPL-SCNC: 96 MMOL/L (ref 98–107)
CO2 SERPL-SCNC: 29.6 MMOL/L (ref 21–32)
COLOR UR: YELLOW
CREAT SERPL-MCNC: 3 MG/DL (ref 0.6–1.3)
EOSINOPHIL # BLD AUTO: 0 K/UL (ref 0–0.4)
EOSINOPHIL NFR BLD AUTO: 0.3 % (ref 0–4)
ERYTHROCYTE [DISTWIDTH] IN BLOOD BY AUTOMATED COUNT: 13.3 % (ref 11.6–13.7)
GFR SERPL CREATININE-BSD FRML MDRD: 27 ML/MIN (ref 90–?)
GLUCOSE SERPL-MCNC: 368 MG/DL (ref 74–106)
GLUCOSE UR STRIP-MCNC: (no result) MG/DL
HCT VFR BLD AUTO: 38.6 % (ref 36–52)
HGB BLD-MCNC: 12.7 G/DL (ref 12–18)
HGB UR QL STRIP: (no result)
LEUKOCYTE ESTERASE UR QL STRIP: NEGATIVE
LYMPHOCYTES # BLD AUTO: 1 K/UL (ref 2–11.5)
LYMPHOCYTES NFR BLD AUTO: 9.1 % (ref 20.5–51.1)
MCH RBC QN AUTO: 27 PG (ref 27–31)
MCHC RBC AUTO-ENTMCNC: 33 G/DL (ref 33–37)
MCV RBC AUTO: 83 FL (ref 80–94)
MONOCYTES # BLD AUTO: 0.8 K/UL (ref 0.8–1)
MONOCYTES NFR BLD AUTO: 7.3 % (ref 1.7–9.3)
NEUTROPHILS # BLD AUTO: 9.4 K/UL (ref 1.8–7.7)
NEUTROPHILS NFR BLD AUTO: 82.7 % (ref 42.2–75.2)
NITRITE UR QL STRIP: NEGATIVE
PH UR STRIP: 6.5 [PH] (ref 5–9)
PLATELET # BLD AUTO: 188 K/UL (ref 140–450)
POTASSIUM SERPL-SCNC: 4.7 MMOL/L (ref 3.5–5.1)
RBC # BLD AUTO: 4.65 MIL/UL (ref 4.2–6.1)
RBC #/AREA URNS HPF: (no result) /HPF (ref 0–5)
SODIUM SERPL-SCNC: 134 MMOL/L (ref 136–145)
WBC # BLD AUTO: 11.4 K/UL (ref 4.8–10.8)
WBC,URINE: (no result) /HPF (ref 0–5)

## 2020-08-21 PROCEDURE — 36415 COLL VENOUS BLD VENIPUNCTURE: CPT

## 2020-08-21 PROCEDURE — 85025 COMPLETE CBC W/AUTO DIFF WBC: CPT

## 2020-08-21 PROCEDURE — 82009 KETONE BODYS QUAL: CPT

## 2020-08-21 PROCEDURE — 83880 ASSAY OF NATRIURETIC PEPTIDE: CPT

## 2020-08-21 PROCEDURE — 80053 COMPREHEN METABOLIC PANEL: CPT

## 2020-08-21 PROCEDURE — 71045 X-RAY EXAM CHEST 1 VIEW: CPT

## 2020-08-21 PROCEDURE — 81001 URINALYSIS AUTO W/SCOPE: CPT

## 2020-08-21 PROCEDURE — C1758 CATHETER, URETERAL: HCPCS

## 2020-08-21 PROCEDURE — 99285 EMERGENCY DEPT VISIT HI MDM: CPT

## 2020-08-21 NOTE — NUR
PT HAVE NOT YET BEEN ABLE TO VOID. STATES USES URINAL USUALLY. WANT TO CONTINUE 
ATTEMPTING TO VOID IN URINAL, DOES NOT WANT TO BE STRAIGHT CATH AT THIS TIME

## 2020-08-21 NOTE — NUR
Patient BIBA BLS from [facility], transferred to bed 8. RN evaluating 
patient at bedside.

## 2020-08-21 NOTE — NUR
NOTIFIED ABBY AMBROSE PT WILL BE D/C. PER FACILITY, NO D/C TRANSPORTATION 
CAN BE PROVIDED BY FACILITY AT THIS TIME.

## 2020-08-21 NOTE — NUR
65/M AMNA FROM [facility] FOR FSBS >500 THIS AM. PER FACILITY PROTOCOL, 
TX TO ER. NO INSULIN GIVEN. PT STATES HE THINKS HE HAD TAKEN PO METFORMIN IN AM 
TODAY. DENIES PAIN, N/V. AAOX4. 



HX- CVA WITH RIGHT SIDED DEFICITYS, DM, CHF, GERD, HTN, DEPRESSION, KIDNEY 
DISEASE, EPILEPSY, BPH 

NKA

## 2020-08-21 NOTE — NUR
Patient discharged with v/s stable. Written and verbal after care instructions 
given and explained. 

Patient verbalized understanding. REPORT TO [facility]SP[facility]. PT IN STABLE 
CONDITION. WILL BE TRANSFERRED VIA GURNEY TO [facility].  All questions 
addressed prior to discharge. Advised to follow up with PMD.

## 2020-08-22 ENCOUNTER — HOSPITAL ENCOUNTER (EMERGENCY)
Dept: HOSPITAL 26 - MED | Age: 65
Discharge: HOME | End: 2020-08-22
Payer: COMMERCIAL

## 2020-08-22 VITALS — WEIGHT: 260 LBS | BODY MASS INDEX: 32.33 KG/M2 | HEIGHT: 75 IN

## 2020-08-22 VITALS — DIASTOLIC BLOOD PRESSURE: 77 MMHG | SYSTOLIC BLOOD PRESSURE: 121 MMHG

## 2020-08-22 VITALS — SYSTOLIC BLOOD PRESSURE: 121 MMHG | DIASTOLIC BLOOD PRESSURE: 77 MMHG

## 2020-08-22 DIAGNOSIS — Z79.82: ICD-10-CM

## 2020-08-22 DIAGNOSIS — E11.65: ICD-10-CM

## 2020-08-22 DIAGNOSIS — Z79.2: ICD-10-CM

## 2020-08-22 DIAGNOSIS — M25.50: Primary | ICD-10-CM

## 2020-08-22 DIAGNOSIS — Z79.899: ICD-10-CM

## 2020-08-22 DIAGNOSIS — E11.22: ICD-10-CM

## 2020-08-22 DIAGNOSIS — N18.9: ICD-10-CM

## 2020-08-22 DIAGNOSIS — Z79.84: ICD-10-CM

## 2020-08-22 DIAGNOSIS — I13.10: ICD-10-CM

## 2020-08-22 NOTE — NUR
Patient discharged with v/s stable. Written and verbal after care instructions 
given and explained. 

Patient alert, oriented and verbalized understanding of instructions. Wheel 
Chair Assisted with to car. All questions addressed prior to discharge. ID band 
removed. Patient advised to follow up with PMD. Rx of JAMI ANGELES 
given. Patient educated on indication of medication including possible reaction 
and side effects. Opportunity to ask questions provided and answered.

## 2021-06-24 NOTE — NUR
Patient Education   Alcohol Use   Many people can enjoy a glass of wine or beer without any negative consequences to their health. According to the Centers for Disease Control and Prevention (CDC), having one or fewer drinks per day for women and two or fewer per day for men is considered moderate drinking.     When people drink more than moderately, it can become concerning. Excessive drinking is defined as consuming 15 drinks or more per week for men and 8 drinks or more per week for women. There are various health problems associated with excessive drinking, which include:    Damage to vital organs like the heart, brain, liver and pancreas    Harm to the digestive tract    Weaken the immune system    Higher risk for heart disease and cancer         Advance Directive  Patient s advance directive was discussed and I am comfortable with the patient s wishes.  Patient Education   Personalized Prevention Plan  You are due for the preventive services outlined below.  Your care team is available to assist you in scheduling these services.  If you have already completed any of these items, please share that information with your care team to update in your medical record.  Health Maintenance   Topic Date Due     ADVANCE DIRECTIVES DISCUSSED WITH PATIENT  06/17/1964     ZOSTER VACCINES (2 of 3) 02/14/2014     COLONOSCOPY  01/02/2018     INFLUENZA VACCINE RULE BASED (1) 08/01/2018     MAMMOGRAM  04/17/2019     TD 18+ HE  09/29/2019     DXA SCAN  04/17/2019     FALL RISK ASSESSMENT  03/12/2020     PNEUMOCOCCAL POLYSACCHARIDE VACCINE AGE 65 AND OVER  Completed     PNEUMOCOCCAL CONJUGATE VACCINE FOR ADULTS (PCV13 OR PREVNAR)  Completed         ENDORSED PATIENT TO DAY SHIFT NURSE. PATIENT IN STABLE CONDITION.